# Patient Record
Sex: FEMALE | Race: WHITE | Employment: OTHER | URBAN - METROPOLITAN AREA
[De-identification: names, ages, dates, MRNs, and addresses within clinical notes are randomized per-mention and may not be internally consistent; named-entity substitution may affect disease eponyms.]

---

## 2017-11-14 ENCOUNTER — IMMUNIZATION (OUTPATIENT)
Dept: FAMILY MEDICINE CLINIC | Facility: CLINIC | Age: 47
End: 2017-11-14

## 2017-11-14 DIAGNOSIS — Z23 NEED FOR VACCINATION: ICD-10-CM

## 2017-11-14 PROCEDURE — 90471 IMMUNIZATION ADMIN: CPT | Performed by: NURSE PRACTITIONER

## 2017-11-14 PROCEDURE — 90686 IIV4 VACC NO PRSV 0.5 ML IM: CPT | Performed by: NURSE PRACTITIONER

## 2018-10-28 ENCOUNTER — IMMUNIZATION (OUTPATIENT)
Dept: FAMILY MEDICINE CLINIC | Facility: CLINIC | Age: 48
End: 2018-10-28
Payer: COMMERCIAL

## 2018-10-28 DIAGNOSIS — Z23 NEED FOR VACCINATION: ICD-10-CM

## 2018-10-28 PROCEDURE — 90686 IIV4 VACC NO PRSV 0.5 ML IM: CPT | Performed by: NURSE PRACTITIONER

## 2018-10-28 PROCEDURE — 90471 IMMUNIZATION ADMIN: CPT | Performed by: NURSE PRACTITIONER

## 2019-10-27 ENCOUNTER — IMMUNIZATION (OUTPATIENT)
Dept: FAMILY MEDICINE CLINIC | Facility: CLINIC | Age: 49
End: 2019-10-27
Payer: COMMERCIAL

## 2019-10-27 DIAGNOSIS — Z23 NEED FOR VACCINATION: ICD-10-CM

## 2019-10-27 PROCEDURE — 90471 IMMUNIZATION ADMIN: CPT | Performed by: PHYSICIAN ASSISTANT

## 2019-10-27 PROCEDURE — 90686 IIV4 VACC NO PRSV 0.5 ML IM: CPT | Performed by: PHYSICIAN ASSISTANT

## 2020-09-26 ENCOUNTER — OFFICE VISIT (OUTPATIENT)
Dept: FAMILY MEDICINE CLINIC | Facility: CLINIC | Age: 50
End: 2020-09-26
Payer: COMMERCIAL

## 2020-09-26 DIAGNOSIS — Z23 NEED FOR VACCINATION: Primary | ICD-10-CM

## 2020-09-26 PROCEDURE — 90686 IIV4 VACC NO PRSV 0.5 ML IM: CPT | Performed by: FAMILY MEDICINE

## 2020-09-26 PROCEDURE — 90471 IMMUNIZATION ADMIN: CPT | Performed by: FAMILY MEDICINE

## 2023-03-20 ENCOUNTER — APPOINTMENT (OUTPATIENT)
Dept: GENERAL RADIOLOGY | Age: 53
End: 2023-03-20
Attending: NURSE PRACTITIONER
Payer: COMMERCIAL

## 2023-03-20 ENCOUNTER — HOSPITAL ENCOUNTER (OUTPATIENT)
Age: 53
Discharge: HOME | End: 2023-03-20
Payer: COMMERCIAL

## 2023-03-20 VITALS
OXYGEN SATURATION: 100 % | RESPIRATION RATE: 17 BRPM | DIASTOLIC BLOOD PRESSURE: 98 MMHG | HEART RATE: 88 BPM | SYSTOLIC BLOOD PRESSURE: 160 MMHG | TEMPERATURE: 98 F

## 2023-03-20 DIAGNOSIS — R05.1 ACUTE COUGH: ICD-10-CM

## 2023-03-20 DIAGNOSIS — U07.1 COVID: Primary | ICD-10-CM

## 2023-03-20 DIAGNOSIS — J01.90 ACUTE SINUSITIS, RECURRENCE NOT SPECIFIED, UNSPECIFIED LOCATION: ICD-10-CM

## 2023-03-20 DIAGNOSIS — R09.82 PND (POST-NASAL DRIP): ICD-10-CM

## 2023-03-20 LAB — SARS-COV-2 RNA RESP QL NAA+PROBE: DETECTED

## 2023-03-20 PROCEDURE — U0002 COVID-19 LAB TEST NON-CDC: HCPCS | Performed by: NURSE PRACTITIONER

## 2023-03-20 PROCEDURE — 71046 X-RAY EXAM CHEST 2 VIEWS: CPT | Performed by: NURSE PRACTITIONER

## 2023-03-20 PROCEDURE — 99203 OFFICE O/P NEW LOW 30 MIN: CPT | Performed by: NURSE PRACTITIONER

## 2023-03-20 RX ORDER — ALBUTEROL SULFATE 90 UG/1
2 AEROSOL, METERED RESPIRATORY (INHALATION) EVERY 4 HOURS PRN
Qty: 1 EACH | Refills: 0 | Status: SHIPPED | OUTPATIENT
Start: 2023-03-20 | End: 2023-04-19

## 2023-03-20 RX ORDER — METOPROLOL SUCCINATE 25 MG/1
25 TABLET, EXTENDED RELEASE ORAL DAILY
COMMUNITY

## 2023-03-20 RX ORDER — BENZONATATE 100 MG/1
100 CAPSULE ORAL 3 TIMES DAILY PRN
Qty: 30 CAPSULE | Refills: 0 | Status: SHIPPED | OUTPATIENT
Start: 2023-03-20 | End: 2023-04-19

## 2023-03-20 RX ORDER — DOXYCYCLINE HYCLATE 100 MG/1
100 CAPSULE ORAL 2 TIMES DAILY
Qty: 14 CAPSULE | Refills: 0 | Status: SHIPPED | OUTPATIENT
Start: 2023-03-20 | End: 2023-03-27

## 2023-03-20 NOTE — DISCHARGE INSTRUCTIONS
Increase water intake 2-3 liters daily   Please do not fill your antibiotic prescription unless you develop worsening symptoms such as fever. Please continue to quarantine per CDC guidelines for COVID. Follow-up with your primary care provider within 1 week after quarantine period is over. If you develop any chest pain or shortness of breath go to the emergency room.